# Patient Record
Sex: MALE | Race: WHITE | NOT HISPANIC OR LATINO | Employment: FULL TIME | URBAN - NONMETROPOLITAN AREA
[De-identification: names, ages, dates, MRNs, and addresses within clinical notes are randomized per-mention and may not be internally consistent; named-entity substitution may affect disease eponyms.]

---

## 2023-10-23 ENCOUNTER — APPOINTMENT (OUTPATIENT)
Dept: PRIMARY CARE | Facility: CLINIC | Age: 58
End: 2023-10-23
Payer: COMMERCIAL

## 2024-12-28 ENCOUNTER — APPOINTMENT (OUTPATIENT)
Dept: RADIOLOGY | Facility: HOSPITAL | Age: 59
End: 2024-12-28
Payer: MEDICARE

## 2024-12-28 ENCOUNTER — HOSPITAL ENCOUNTER (EMERGENCY)
Facility: HOSPITAL | Age: 59
Discharge: HOME | End: 2024-12-28
Payer: MEDICARE

## 2024-12-28 VITALS
HEART RATE: 91 BPM | HEIGHT: 72 IN | WEIGHT: 180 LBS | BODY MASS INDEX: 24.38 KG/M2 | RESPIRATION RATE: 16 BRPM | DIASTOLIC BLOOD PRESSURE: 96 MMHG | TEMPERATURE: 98 F | OXYGEN SATURATION: 96 % | SYSTOLIC BLOOD PRESSURE: 153 MMHG

## 2024-12-28 DIAGNOSIS — J40 BRONCHITIS: ICD-10-CM

## 2024-12-28 DIAGNOSIS — R91.1 LUNG NODULE: Primary | ICD-10-CM

## 2024-12-28 LAB
FLUAV RNA RESP QL NAA+PROBE: NOT DETECTED
FLUBV RNA RESP QL NAA+PROBE: NOT DETECTED
SARS-COV-2 RNA RESP QL NAA+PROBE: NOT DETECTED

## 2024-12-28 PROCEDURE — 99284 EMERGENCY DEPT VISIT MOD MDM: CPT | Mod: 25

## 2024-12-28 PROCEDURE — 87636 SARSCOV2 & INF A&B AMP PRB: CPT | Performed by: NURSE PRACTITIONER

## 2024-12-28 PROCEDURE — 71046 X-RAY EXAM CHEST 2 VIEWS: CPT

## 2024-12-28 PROCEDURE — 71046 X-RAY EXAM CHEST 2 VIEWS: CPT | Performed by: RADIOLOGY

## 2024-12-28 RX ORDER — PREDNISONE 20 MG/1
40 TABLET ORAL DAILY
Qty: 10 TABLET | Refills: 0 | Status: SHIPPED | OUTPATIENT
Start: 2024-12-28 | End: 2025-01-02

## 2024-12-28 RX ORDER — BENZONATATE 100 MG/1
100 CAPSULE ORAL EVERY 8 HOURS
Qty: 21 CAPSULE | Refills: 0 | Status: SHIPPED | OUTPATIENT
Start: 2024-12-28 | End: 2025-01-04

## 2024-12-28 RX ORDER — ALBUTEROL SULFATE 90 UG/1
1-2 INHALANT RESPIRATORY (INHALATION) EVERY 6 HOURS PRN
Qty: 18 G | Refills: 0 | Status: SHIPPED | OUTPATIENT
Start: 2024-12-28 | End: 2025-01-27

## 2024-12-28 ASSESSMENT — PAIN DESCRIPTION - LOCATION: LOCATION: CHEST

## 2024-12-28 ASSESSMENT — COLUMBIA-SUICIDE SEVERITY RATING SCALE - C-SSRS
6. HAVE YOU EVER DONE ANYTHING, STARTED TO DO ANYTHING, OR PREPARED TO DO ANYTHING TO END YOUR LIFE?: NO
2. HAVE YOU ACTUALLY HAD ANY THOUGHTS OF KILLING YOURSELF?: NO
1. IN THE PAST MONTH, HAVE YOU WISHED YOU WERE DEAD OR WISHED YOU COULD GO TO SLEEP AND NOT WAKE UP?: NO

## 2024-12-28 ASSESSMENT — PAIN - FUNCTIONAL ASSESSMENT: PAIN_FUNCTIONAL_ASSESSMENT: 0-10

## 2024-12-28 ASSESSMENT — PAIN SCALES - GENERAL: PAINLEVEL_OUTOF10: 6

## 2024-12-28 NOTE — Clinical Note
Phong Cortes was seen and treated in our emergency department on 12/28/2024.  He may return to work on 12/30/2024.       If you have any questions or concerns, please don't hesitate to call.      Christina Gtz, APRN-CNP

## 2024-12-28 NOTE — ED PROVIDER NOTES
Chief Complaint   Patient presents with    Cough     Pt complains of cough and congestion starting Sunday night, vomiting only lasted a day last emesis on Tuesday. Pt states he has pain from coughing. Felt fevered, (did not take his temperature) Runny nose, achy and chills.         Patient History    Past Medical History:   Diagnosis Date    Contact with and (suspected) exposure to pediculosis, acariasis and other infestations 03/26/2020    Scabies exposure      Past Surgical History:   Procedure Laterality Date    OTHER SURGICAL HISTORY  01/08/2020    Intestinal surgery      No family history on file.   Social History     Social History Narrative    Not on file      Allergies   Allergen Reactions    Penicillin Hives        PMH: Reviewed  PSH: Reviewed  Social History: Reviewed.   Allergies reviewed.     HPI: Phong Cortes Jr. is a 59 y.o. male who presents to the ED today unaccompanied with complaints of cough, congestion, runny nose, chills x6 days. Denies nausea/vomiting/diarrhea. Denies chest pain, shortness of breath. Smokes 1.5 ppd. Unknown ill contacts.     PHYSICAL EXAM:    GENERAL: Vitals noted, no distress. Alert and oriented x 3. Non-toxic.       HEAD: Normocephalic, atraumatic. Pupils equally round and reactive to light. EOMI.     NECK: Supple. No midline or paraspinal tenderness through full range of motion.      CARDIAC: Regular rate, rhythm. No murmurs or rubs.    RESPIRATORY: Lungs clear and equal bilaterally with cough on deep inspiration. No respiratory distress.     MUSCULOSKELETAL & SKIN:  Warm, dry, and intact. No rash/lesions. No peripheral edema.     NEURO: No focal neurologic deficits, acting appropriately.     Labs Reviewed   SARS-COV-2 AND INFLUENZA A/B PCR - Normal       Result Value    Flu A Result Not Detected      Flu B Result Not Detected      Coronavirus 2019, PCR Not Detected      Narrative:     This assay has received FDA Emergency Use Authorization (EUA) and  is only authorized for  the duration of time that circumstances exist to justify the authorization of the emergency use of in vitro diagnostic tests for the detection of SARS-CoV-2 virus and/or diagnosis of COVID-19 infection under section 564(b)(1) of the Act, 21 U.S.C. 360bbb-3(b)(1). Testing for SARS-CoV-2 is only recommended for patients who meet current clinical and/or epidemiological criteria as defined by federal, state, or local public health directives. This assay is an in vitro diagnostic nucleic acid amplification test for the qualitative detection of SARS-CoV-2, Influenza A, and Influenza B from nasopharyngeal specimens and has been validated for use at [facility]. Negative results do not preclude COVID-19 infections or Influenza A/B infections, and should not be used as the sole basis for diagnosis, treatment, or other management decisions. If Influenza A/B and RSV PCR results are negative, testing for Parainfluenza virus, Adenovirus and Metapneumovirus is routinely performed for [facility] and intensive care inpatients, and is available on other patients by placing an add-on request.         XR chest 2 views   Final Result   Question of 8 mm right lung nodule. Correlation with CT scan is   suggested.        MACRO:   none        Signed by: Sultana Moreno 12/28/2024 10:54 AM   Dictation workstation:   [id number]           Medical Decision Making         ED COURSE: This patient was seen and examined by myself independently. Sent for CXR imaging to rule out pneumonia and nasal swab obtained for covid/flu rule out. HR initially elevated 116 in triage. Improves to 95 with resting. CXR with concern for 8mm nodule RLL, suggesting CT imaging. He is updated and aware of this. He does not want to get the CT today, would rather complete testing as an outpatient. Aware that we can also check for other lung concerns of pneumonia/PE with additional testing, but again declined CT imaging today. Nasal swab  negative for Covid and flu. He is advised to followup with his PCP for outpatient testing. E-rx for prednisone, albuterol inhaler, and tessalon perles sent to his pharmacy of choice. He is discharged home in a stable condition with computer instructions given and is encouraged to return to the ER for any new or worsening symptoms.       DIAGNOSTIC IMPRESSION: #1 bronchitis #2 lung nodule     Christina Gtz, APRN-CNP  12/28/24 1129

## 2025-01-08 ENCOUNTER — APPOINTMENT (OUTPATIENT)
Age: 60
End: 2025-01-08
Payer: MEDICARE

## 2025-01-27 ENCOUNTER — APPOINTMENT (OUTPATIENT)
Age: 60
End: 2025-01-27
Payer: MEDICARE